# Patient Record
Sex: MALE | Race: BLACK OR AFRICAN AMERICAN | Employment: FULL TIME | URBAN - METROPOLITAN AREA
[De-identification: names, ages, dates, MRNs, and addresses within clinical notes are randomized per-mention and may not be internally consistent; named-entity substitution may affect disease eponyms.]

---

## 2019-12-26 PROBLEM — N40.1 BPH WITH OBSTRUCTION/LOWER URINARY TRACT SYMPTOMS: Status: ACTIVE | Noted: 2019-12-26

## 2019-12-26 PROBLEM — N52.9 ERECTILE DYSFUNCTION: Status: ACTIVE | Noted: 2019-12-26

## 2019-12-26 PROBLEM — N13.8 BPH WITH OBSTRUCTION/LOWER URINARY TRACT SYMPTOMS: Status: ACTIVE | Noted: 2019-12-26

## 2020-11-18 ENCOUNTER — HOSPITAL ENCOUNTER (OUTPATIENT)
Dept: PHYSICAL THERAPY | Age: 65
Discharge: HOME | End: 2020-11-18
Payer: COMMERCIAL

## 2020-11-18 PROCEDURE — 97140 MANUAL THERAPY 1/> REGIONS: CPT

## 2020-11-18 PROCEDURE — 97162 PT EVAL MOD COMPLEX 30 MIN: CPT

## 2020-11-18 NOTE — PROGRESS NOTES
[facility]  [address] [place] [address] [address],[facility], [facility] - Phone: [phone]  Fax: [phone] / [address] [address] PHYSICAL THERAPY SERVICES  Patient Name: Patrick Ballard : 1955   Medical   Diagnosis: Right hip pain [M25.551] Treatment Diagnosis: Right hip pain [M25.551]   Onset Date:      Referral Source: Robert Jimenez MD [facility] [place], [facility]): 2020   Prior Hospitalization: See medical history Provider #: [id number]   Prior Level of Function: Pain free and unlimited stair navigation and prolonged ambulation   Comorbidities: Arthritis, BMI >30 HTN, and R THR (2017)   Medications: Verified on Patient Summary List   The Plan of Care and following information is based on the information from the initial evaluation.   ===========================================================================================  Assessment / key information: Patient is a 60 y.o. male who presents to [facility] at [facility] [place], [facility]. with diagnosis of Right hip pain [M25.551]. Patient reports right hip pain began 4 months ago with insidious onset. Notes h/o R THR 2017. Pt describes right hip pain as an intermittent stiffness located in the lateral aspect of the hip. Patient's pain level is rated at 0/10 at the best, 3/10 currently, and 4/10 at the worst. Hip pain increases with navigating stairs, prolonged ambulation (around [employer]), and transferring out of truck, decreases with rest.   Upon objective evaluation patient presents with impaired AROM of the right hip in ER (11 deg), extension (-15 deg) and ABD (26 deg), grossly impaired hip strength (Psoas 4/5, Glute Med 3/5, and Glute Max 2+/5), impaired static standing balance (R/L SLS = \"), and decreased flexibility of hamstring (R Ham 90/90 = 150 deg), hip flexor, and, quadriceps muscles.  Demonstrates increased mm tone and TTP to R ITB and greater trochanter. Patient scored 51/100 on FOTO indicating decreased function and quality of life. Patient can benefit from skilled PT to increase hip ROM, strength, joint mobility, flexibility, and balance, decrease swelling, tone, TTP, and pain to improve overall function and quality of life. Hip Joint Left  AROM Right  AROM Left  Strength Right  Strength   Flexion 92 91 5 4   Extension -8 -15 2+ 2+   Abduction 58 26 3+ 3   IR 35 45 5 4+   ER 25 11 5 4+     ==========================================================================================================================================================================  Eval Complexity: History HIGH Complexity :3+ comorbidities / personal factors will impact the outcome/ POC ;  Examination  HIGH Complexity : 4+ Standardized tests and measures addressing body structure, function, activity limitation and / or participation in recreation ; Presentation MEDIUM Complexity : Evolving with changing characteristics ;   Decision Making MEDIUM Complexity : FOTO score of 26-74; Overall Complexity MEDIUM  Problem List: pain affecting function, decrease ROM, decrease strength, edema affecting function, impaired gait/ balance, decrease ADL/ functional abilities, decrease activity tolerance, decrease flexibility/ joint mobility and decrease transfer abilities   Treatment Plan may include any combination of the following: Therapeutic exercise, Therapeutic activities, Neuromuscular re-education, Physical agent/modality, Gait/balance training, Manual therapy, Aquatic therapy, Patient education, Self Care training, Functional mobility training, Home safety training and Stair training  Patient / Family readiness to learn indicated by: asking questions, trying to perform skills and interest  Persons(s) to be included in education: patient (P)  Barriers to Learning/Limitations: None  Measures taken, if barriers to learning:    Patient Goal (s): \"Improve mobility\"   Patient self reported health status: good  Rehabilitation Potential: good   Short Term Goals: To be accomplished in  2  weeks:  1) Establish home exercise program.  2) Patient will report decreased c/o pain to < or = 3/10 at the worst to facilitate improved ease with prolonged standing ADLs with manageable sx in the right hip.  Long Term Goals: To be accomplished in  5  weeks:  1) Patient to be independent & compliant with a progressive, high level HEP in order to maintain gains made in physical therapy. 2) Patient to increased walking duration around [employer] to unrestricted and pain free in order to return to PLOF. 3) Increase FOTO to 69/100 indicating improved function and quality of life. 4) Patient will increase right hip strength in ABD to 4-/5 so patient has improved ability to tolerate prolonged ambulation. Frequency / Duration:   Patient to be seen  2  times per week for 5  weeks:  Patient / Caregiver education and instruction: self care, activity modification, brace/ splint application and exercises  Therapist Signature: Candelaria Molina Date: 66/04/9830   Certification Period: n/a Time: 8:51 AM   ===========================================================================================  I certify that the above Physical Therapy Services are being furnished while the patient is under my care. I agree with the treatment plan and certify that this therapy is necessary. Physician Signature:        Date:       Time:     Please sign and return to [facility] at [facility], [facility]. or you may fax the signed copy to [phone]. Thank you.

## 2020-11-18 NOTE — PROGRESS NOTES
PHYSICAL THERAPY - DAILY TREATMENT NOTE    Patient Name: Nellie Kumar        Date: 2020  : 1955   Yes Patient  Verified  Visit #:   1   of   10  Insurance: Payor: [employer] / Plan: [employer] [facility] / Product Type: PPO /      In time: 3:58 Out time: 4:26   Total Treatment Time: 28     Medicare/BCBS Time Tracking (below)   Total Timed Codes (min):  8 1:1 Treatment Time:  8     TREATMENT AREA =  Right hip pain [M25.551]    SUBJECTIVE  Pain Level (on 0 to 10 scale):  0  / 10   Medication Changes/New allergies or changes in medical history, any new surgeries or procedures?    no  If yes, update Summary List   Subjective Functional Status/Changes:  []  No changes reported     See POC         OBJECTIVE  Modalities Rationale:     8 min Manual Therapy: L S/L STM/DTM to lateral RF,glute med, ITB and TFL   Rationale:      decrease pain, increase ROM, increase tissue extensibility and decrease trigger points to improve patient's ability to navigate stairs  The manual therapy interventions were performed at a separate and distinct time from the therapeutic activities interventions.      Billed With/As:   [x] TE   [] TA   [] Neuro   [] Self Care Patient Education: [x] Review HEP    [x] Progressed/Changed HEP based on:   [x] positioning   [x] body mechanics   [] transfers   [] heat/ice application    [] other:      Other Objective/Functional Measures:    See POC     Post Treatment Pain Level (on 0 to 10) scale:   0  / 10     ASSESSMENT  Assessment/Changes in Function:     See POC     []  See Progress Note/Recertification   Patient will continue to benefit from skilled PT services to modify and progress therapeutic interventions, address functional mobility deficits, address ROM deficits, address strength deficits, analyze and address soft tissue restrictions, analyze and cue movement patterns, analyze and modify body mechanics/ergonomics, assess and modify postural abnormalities and instruct in home and community integration to attain remaining goals.    Progress toward goals / Updated goals:    See newly established goals in POC     PLAN  [x]  Upgrade activities as tolerated yes Continue plan of care   []  Discharge due to :    []  Other:      Therapist: America Rosen    Date: 11/18/2020 Time: 8:51 AM     Future Appointments   Date Time Provider Milind Alcocer   11/18/2020  4:45 PM Dulce Florez

## 2020-12-02 ENCOUNTER — HOSPITAL ENCOUNTER (OUTPATIENT)
Dept: PHYSICAL THERAPY | Age: 65
Discharge: HOME | End: 2020-12-02
Payer: COMMERCIAL

## 2020-12-02 PROCEDURE — 97110 THERAPEUTIC EXERCISES: CPT

## 2020-12-02 PROCEDURE — 97140 MANUAL THERAPY 1/> REGIONS: CPT

## 2020-12-02 NOTE — PROGRESS NOTES
PHYSICAL THERAPY - DAILY TREATMENT NOTE    Patient Name: Daly Lopez        Date: 2020  : 1955   yes Patient  Verified  Visit #:     Insurance: Payor: BLUE CROSS / Plan: [employer] [facility] / Product Type: PPO /      In time: 325 Out time: 405   Total Treatment Time: 40     Medicare/[employer] Time Tracking (below)   Total Timed Codes (min):  40 1:1 Treatment Time:  40     TREATMENT AREA =  Right hip pain [M25.551]    SUBJECTIVE  Pain Level (on 0 to 10 scale):  0  / 10   Medication Changes/New allergies or changes in medical history, any new surgeries or procedures?    no  If yes, update Summary List   Subjective Functional Status/Changes:  []  No changes reported     Pt reports no pain in the hip, just a general discomfort. OBJECTIVE    30 min Therapeutic Exercise:  [x]  See flow sheet   Rationale:      increase ROM, increase strength, improve coordination and improve balance to improve the patients ability to perform pain free ADLs. 10 min Manual Therapy: TPR (R) glute med. Rationale:      decrease pain, increase ROM, increase tissue extensibility and decrease trigger points to improve patient's ability to perform pain free ADLs. The manual therapy interventions were performed at a separate and distinct time from the therapeutic activities interventions. Billed With/As:   [x] TE   [] TA   [] Neuro   [] Self Care Patient Education: [x] Review HEP    [] Progressed/Changed HEP based on:   [] positioning   [] body mechanics   [] transfers   [] heat/ice application    [] other:      Other Objective/Functional Measures: Added multiple exercises to improve LE/core strength for ADLs. See flow sheet. Post Treatment Pain Level (on 0 to 10) scale:   0   / 10     ASSESSMENT  Assessment/Changes in Function:     Good response to initial therex.       []  See Progress Note/Recertification   Patient will continue to benefit from skilled PT services to modify and progress therapeutic interventions, address functional mobility deficits, address ROM deficits, address strength deficits, analyze and address soft tissue restrictions, analyze and cue movement patterns, analyze and modify body mechanics/ergonomics and assess and modify postural abnormalities to attain remaining goals. Progress toward goals / Updated goals:    Initiated therex.       PLAN  [x]  Upgrade activities as tolerated yes Continue plan of care   []  Discharge due to :    []  Other:      Therapist: Chrystal Jacobson PTA    Date: 12/2/2020 Time: 3:29 PM     Future Appointments   Date Time Provider Milind Alcocer   12/2/2020  3:45 PM [facility]   12/7/2020  3:45 PM [facility]   12/9/2020  3:45 PM Merlene Rollins [facility]   12/14/2020  3:45 PM [facility]   12/16/2020  3:15 PM [facility]   12/21/2020  3:45 PM Merlene Ria [facility]   12/28/2020  3:45 PM [facility]   12/30/2020  4:00 PM [facility]   1/4/2021  3:45 PM [facility]   1/6/2021  4:00 [facility]   1/11/2021  3:45 PM [facility]   1/13/2021  4:00 PM Damián De Anda

## 2020-12-07 ENCOUNTER — APPOINTMENT (OUTPATIENT)
Dept: PHYSICAL THERAPY | Age: 65
End: 2020-12-07
Payer: COMMERCIAL

## 2020-12-09 ENCOUNTER — HOSPITAL ENCOUNTER (OUTPATIENT)
Dept: PHYSICAL THERAPY | Age: 65
Discharge: HOME | End: 2020-12-09
Payer: COMMERCIAL

## 2020-12-09 PROCEDURE — 97140 MANUAL THERAPY 1/> REGIONS: CPT

## 2020-12-09 PROCEDURE — 97110 THERAPEUTIC EXERCISES: CPT

## 2020-12-09 NOTE — PROGRESS NOTES
PHYSICAL THERAPY - DAILY TREATMENT NOTE    Patient Name: Paris Wakefield        Date: 2020  : 1955   yes Patient  Verified  Visit #:   3   of   12  Insurance: Payor: BLUE CROSS / Plan: [employer] [facility] / Product Type: PPO /      In time: 345 Out time: 425   Total Treatment Time: 40     Medicare/[employer] Time Tracking (below)   Total Timed Codes (min):  40 1:1 Treatment Time:       TREATMENT AREA =  Right hip pain [M25.551]    SUBJECTIVE  Pain Level (on 0 to 10 scale):  5  / 10   Medication Changes/New allergies or changes in medical history, any new surgeries or procedures?    no  If yes, update Summary List   Subjective Functional Status/Changes:  []  No changes reported     I was pretty sore after the last session. OBJECTIVE  30 min Therapeutic Exercise:  [x]  See flow sheet   Rationale:      increase ROM, increase strength, improve coordination and improve balance to improve the patients ability to perform pain free ADLs. 10 Min Manual Therapy: TPR (R) glute/piriformis. Rationale:      decrease pain, increase ROM, increase tissue extensibility and decrease trigger points to improve patient's ability to perform pain free ADLs. The manual therapy interventions were performed at a separate and distinct time from the therapeutic activities interventions. Billed With/As:   [x] TE   [] TA   [] Neuro   [] Self Care Patient Education: [x] Review HEP    [] Progressed/Changed HEP based on:   [] positioning   [] body mechanics   [] transfers   [] heat/ice application    [] other:      Other Objective/Functional Measures: Therex per flow sheet. Post Treatment Pain Level (on 0 to 10) scale:   0   / 10     ASSESSMENT  Assessment/Changes in Function:     TTP (R) glute med.        []  See Progress Note/Recertification   Patient will continue to benefit from skilled PT services to modify and progress therapeutic interventions, address functional mobility deficits, address ROM deficits, address strength deficits, analyze and address soft tissue restrictions, analyze and cue movement patterns, analyze and modify body mechanics/ergonomics and assess and modify postural abnormalities to attain remaining goals. Progress toward goals / Updated goals:    No change in progress toward LTG's with today's session.       PLAN  [x]  Upgrade activities as tolerated yes Continue plan of care   []  Discharge due to :    []  Other:      Therapist: Edgard Lovett PTA    Date: 12/9/2020 Time: 4:04 PM     Future Appointments   Date Time Provider Milind Alcocer   12/14/2020  3:45 [facility]   12/16/2020  3:15 [facility]   12/21/2020  3:45 PM Kitty Zepeda PTA [address]   12/28/2020  3:45 PM Izzy Preeti [facility]   12/30/2020  4:00 PM Clementina Arnold [facility]   1/4/2021  3:45 [facility]   1/6/2021  4:00 PM Izzy Din [facility]   1/11/2021  3:45 PM [facility]   1/13/2021  4:00 PM Zeke Kitchen

## 2020-12-14 ENCOUNTER — HOSPITAL ENCOUNTER (OUTPATIENT)
Dept: PHYSICAL THERAPY | Age: 65
Discharge: HOME | End: 2020-12-14
Payer: COMMERCIAL

## 2020-12-14 PROCEDURE — 97140 MANUAL THERAPY 1/> REGIONS: CPT

## 2020-12-14 PROCEDURE — 97110 THERAPEUTIC EXERCISES: CPT

## 2020-12-14 NOTE — PROGRESS NOTES
PHYSICAL THERAPY - DAILY TREATMENT NOTE    Patient Name: Garrett Pena        Date: 2020  : 1955   yes Patient  Verified  Visit #:     Insurance: Payor: BLUE CROSS / Plan: [employer] [facility] / Product Type: PPO /      In time: 3:40 Out time: 4:20   Total Treatment Time: 40     Medicare/BCBS Time Tracking (below)   Total Timed Codes (min):  40 1:1 Treatment Time: 40     TREATMENT AREA =  Right hip pain [M25.551]    SUBJECTIVE  Pain Level (on 0 to 10 scale):  4-5  / 10   Medication Changes/New allergies or changes in medical history, any new surgeries or procedures?    no  If yes, update Summary List   Subjective Functional Status/Changes:  []  No changes reported     Every days is laborous - it gets tiered and worn out. I was doing them today at work. They work work but you can pay a price       OBJECTIVE  31 min Therapeutic Exercise:  [x]  See flow sheet   Rationale:      increase ROM, increase strength, improve coordination and improve balance to improve the patients ability to perform pain free ADLs. 9 Min Manual Therapy: L S/L STM/DTM to lateral RF,glute med, ITB and TFL   Rationale:      decrease pain, increase ROM, increase tissue extensibility and decrease trigger points to improve patient's ability to navigate stairs. The manual therapy interventions were performed at a separate and distinct time from the therapeutic activities interventions. Billed With/As:   [x] TE   [] TA   [] Neuro   [] Self Care Patient Education: [x] Review HEP    [] Progressed/Changed HEP based on:   [] positioning   [] body mechanics   [] transfers   [] heat/ice application    [] other:      Other Objective/Functional Measures:    Supine bridge = ~65%  Added ITB stretch and patient reports stretching denies pain      Post Treatment Pain Level (on 0 to 10) scale:   3  / 10     ASSESSMENT  Assessment/Changes in Function:     Continues to demonstrate TTP and incr tone to R lateral hip. Issued HEP. []  See Progress Note/Recertification   Patient will continue to benefit from skilled PT services to modify and progress therapeutic interventions, address functional mobility deficits, address ROM deficits, address strength deficits, analyze and address soft tissue restrictions, analyze and cue movement patterns, analyze and modify body mechanics/ergonomics and assess and modify postural abnormalities to attain remaining goals. Progress toward goals / Updated goals:    1) Establish home exercise program. Goal in progress (12/14/2020)  2) Patient will report decreased c/o pain to < or = 3/10 at the worst to facilitate improved ease with prolonged standing ADLs with manageable sx in the right hip.  Goal in progress 5/10 pain at the worst (12/14/2020)     PLAN  [x]  Upgrade activities as tolerated yes Continue plan of care   []  Discharge due to :    []  Other:      Therapist: Don Hood    Date: 12/14/2020 Time: 4:04 PM     Future Appointments   Date Time Provider Milind Alcocer   12/14/2020  3:45 PM [facility]   12/16/2020  3:15 PM M[facility]is [facility]   12/21/2020  3:45 PM Divya Gallegos [facility]   12/28/2020  3:45 PM [facility]   12/30/2020  4:00 PM May Justice [facility]   1/4/2021  3:45 PM Murtis [facility]   1/6/2021  4:00 PM May Justice [facility]   1/11/2021  3:45 PM May Justice [facility]   1/13/2021  4:00 PM Kourtney Xie

## 2020-12-16 ENCOUNTER — HOSPITAL ENCOUNTER (OUTPATIENT)
Dept: PHYSICAL THERAPY | Age: 65
End: 2020-12-16
Payer: COMMERCIAL

## 2020-12-21 ENCOUNTER — HOSPITAL ENCOUNTER (OUTPATIENT)
Dept: PHYSICAL THERAPY | Age: 65
Discharge: HOME | End: 2020-12-21
Payer: COMMERCIAL

## 2020-12-21 PROCEDURE — 97140 MANUAL THERAPY 1/> REGIONS: CPT

## 2020-12-21 PROCEDURE — 97110 THERAPEUTIC EXERCISES: CPT

## 2020-12-21 NOTE — PROGRESS NOTES
PHYSICAL THERAPY - DAILY TREATMENT NOTE    Patient Name: Claudia Reyes        Date: 2020  : 1955   yes Patient  Verified  Visit #:     Insurance: Payor: BLUE CROSS / [facility] / Product Type: PPO /      In time: 320 Out time: 4   Total Treatment Time: 40     Medicare/[employer] Time Tracking (below)   Total Timed Codes (min):  40 1:1 Treatment Time:       TREATMENT AREA =  Right hip pain [M25.551]    SUBJECTIVE  Pain Level (on 0 to 10 scale):  4  / 10   Medication Changes/New allergies or changes in medical history, any new surgeries or procedures?    no  If yes, update Summary List   Subjective Functional Status/Changes:  []  No changes reported     Pt reporting recent max pain of 5/10, 60% improvement in symptoms since beginning PT. Pt reports no limit to walking around [employer], pain free. OBJECTIVE  30 min Therapeutic Exercise:  [x]  See flow sheet   Rationale:      increase ROM, increase strength and improve coordination to improve the patients ability to perform pain free ADLs. 10 min Manual Therapy: TPR (R) glute. Rationale:      decrease pain, increase ROM, increase tissue extensibility and decrease trigger points to improve patient's ability to perform pain free ADLs. The manual therapy interventions were performed at a separate and distinct time from the therapeutic activities interventions.       Billed With/As:   [x] TE   [] TA   [] Neuro   [] Self Care Patient Education: [x] Review HEP    [] Progressed/Changed HEP based on:   [] positioning   [] body mechanics   [] transfers   [] heat/ice application    [] other:      Other Objective/Functional Measures:    FOTO = 56  GROC = +4     (R) hip strength: Abd = 3+/5     Post Treatment Pain Level (on 0 to 10) scale:   0   / 10     ASSESSMENT  Assessment/Changes in Function:     See PN     []  See Progress Note/Recertification   Patient will continue to benefit from skilled PT services to modify and progress therapeutic interventions, address functional mobility deficits, address ROM deficits, address strength deficits, analyze and address soft tissue restrictions, analyze and cue movement patterns, analyze and modify body mechanics/ergonomics and assess and modify postural abnormalities to attain remaining goals.    Progress toward goals / Updated goals:    See PN     PLAN  [x]  Upgrade activities as tolerated yes Continue plan of care   []  Discharge due to :    []  Other:      Therapist: Rosalie Hi, PTA    Date: 12/21/2020 Time: 4:33 PM     Future Appointments   Date Time Provider Milind Alcocer   12/28/2020  3:45 PM Kit [address]   12/30/2020  4:00 PM Scotty Stade [facility]   1/4/2021  3:45 PM Lyme Stade [facility]   1/6/2021  4:00 PM Lyme Stade [facility]   1/11/2021  3:45 PM Lyme Stade [facility]   1/13/2021  4:00 PM [facility]

## 2020-12-21 NOTE — PROGRESS NOTES
[address] [facility] PHYSICAL THERAPY   [facility] [address], [address],[employer], [facility] - Phone: [phone]  Fax: [phone]  PROGRESS NOTE  Patient Name: Nellie Kumar : 1955   Treatment/Medical Diagnosis: Right hip pain [M25.551]   Referral Source: Loreta Alas MD     Date of Initial Visit: 2020 Attended Visits: 5 Missed Visits: 1     SUMMARY OF TREATMENT  Pt has attended 5 PT sessions consisting of initial evaluation, therapeutic exercises, HEP, manual therapy, patient education, and modalities to improve LE strength/stability, decrease pain, and prepare for DC with HEP. CURRENT STATUS  Pt [facility] to [facility] w/ c/o (R) hip pain. Pt reporting recent max pain of 5/10, 60% improvement in symptoms since beginning PT. Pt reporting significant improvement in ambulation ability - able to walk around the store [employer]) without increases in pain. Current FOTO = 56, GROC = +4. (R) hip strength in abd is 3+/5. Pt remains TTP along (R) glute/glute med. Pt is partially compliant with HEP. Goal/Measure of Progress Goal Met? 1. Patient to be independent & compliant with a progressive, high level HEP in order to maintain gains made in physical therapy. Status at last Eval: Na  Current Status: HEP established, partial compliance  progressing   2. Patient to increased walking duration around [employer] to unrestricted and pain free in order to return to OF. Status at last Eval: Elevated pain Current Status: Unrestricted and without pain per pt.  yes   3. Increase FOTO to 69/100 indicating improved function and quality of life. Status at last Eval: 51 Current Status: 56 progressing     4. Patient will increase right hip strength in ABD to 4-/5 so patient has improved ability to tolerate prolonged ambulation.    Status at last Eval: 3/5  Current Status: 3+/5  progressing     New Goals to be achieved in __4__  weeks:  1) Patient to be independent & compliant with a progressive, high level HEP in order to maintain gains made in physical therapy. 2) Increase FOTO to 69/100 indicating improved function and quality of life. 3) Patient will increase right hip strength in ABD to 4-/5 so patient has improved ability to tolerate prolonged ambulation. RECOMMENDATIONS  Pt to continue 2x weekly for up to an additional 4 weeks to further improve LE strength, decrease pain, and prepare for DC to long-term HEP. If you have any questions/comments please contact us directly at [phone]. Thank you for allowing us to assist in the care of your patient. LPTA Signature: Ric Golden PTA  Date: 12/21/2020   PT Signature: Guilherme Voss DPT Time: 4:36 PM   NOTE TO PHYSICIAN:  PLEASE COMPLETE THE ORDERS BELOW AND FAX TO   [facility]: [phone]  If you are unable to process this request in 24 hours please contact our office: [phone]    ___ I have read the above report and request that my patient continue as recommended.   ___ I have read the above report and request that my patient continue therapy with the following changes/special instructions:_________________________________________________________   ___ I have read the above report and request that my patient be discharged from therapy.      Physician Signature:        Date:       Time:

## 2020-12-28 ENCOUNTER — HOSPITAL ENCOUNTER (OUTPATIENT)
Dept: PHYSICAL THERAPY | Age: 65
Discharge: HOME | End: 2020-12-28
Payer: COMMERCIAL

## 2020-12-28 PROCEDURE — 97110 THERAPEUTIC EXERCISES: CPT

## 2020-12-28 PROCEDURE — 97140 MANUAL THERAPY 1/> REGIONS: CPT

## 2020-12-28 NOTE — PROGRESS NOTES
PHYSICAL THERAPY - DAILY TREATMENT NOTE    Patient Name: Cosmo Key        Date: 2020  : 1955   yes Patient  Verified  Visit #:     Insurance: Payor: BLUE CROSS / Plan: Arjun Rocha / Product Type: PPO /      In time: 3:16 Out time: 4:00   Total Treatment Time: 44     Medicare/[facility] Time Tracking (below)   Total Timed Codes (min):  44 1:1 Treatment Time: 44     TREATMENT AREA =  Right hip pain [M25.551]    SUBJECTIVE  Pain Level (on 0 to 10 scale):  4  / 10   Medication Changes/New allergies or changes in medical history, any new surgeries or procedures?    no  If yes, update Summary List   Subjective Functional Status/Changes:  []  No changes reported     No new complaints, overall a little better but still gets tiered. Notes R LE numbness today       OBJECTIVE  35 min Therapeutic Exercise:  [x]  See flow sheet   Rationale:      increase ROM, increase strength and improve coordination to improve the patients ability to perform pain free ADLs. 9 min Manual Therapy: L S/L STM/DTM to lateral RF,glute med, ITB and TFL   Rationale:      decrease pain, increase ROM, increase tissue extensibility and decrease trigger points to improve patient's ability to perform pain free ADLs. The manual therapy interventions were performed at a separate and distinct time from the therapeutic activities interventions.     Billed With/As:   [x] TE   [] TA   [] Neuro   [] Self Care Patient Education: [x] Review HEP    [] Progressed/Changed HEP based on:   [] positioning   [] body mechanics   [] transfers   [] heat/ice application    [] other:      Other Objective/Functional Measures:    Increased ankle strategy with SLS, intermittently wraps LE around opposite LE to maintain balance   Added squatting with Tband  Progressed hip ABD and extension to with Tband resistance      Post Treatment Pain Level (on 0 to 10) scale:  4   / 10     ASSESSMENT  Assessment/Changes in Function:     Progressed therex as appropriate with increased challenge. Slow improvements in tone in glute med. []  See Progress Note/Recertification   Patient will continue to benefit from skilled PT services to modify and progress therapeutic interventions, address functional mobility deficits, address ROM deficits, address strength deficits, analyze and address soft tissue restrictions, analyze and cue movement patterns, analyze and modify body mechanics/ergonomics and assess and modify postural abnormalities to attain remaining goals. Progress toward goals / Updated goals:    Progressing towards LTGs for strengthening.       PLAN  [x]  Upgrade activities as tolerated yes Continue plan of care   []  Discharge due to :    []  Other:      Therapist: Bob Weiss    Date: 12/28/2020 Time: 4:33 PM     Future Appointments   Date Time Provider Milind Alcocer   12/28/2020  3:45 PM Cholo Beverly [address]   12/30/2020  4:00 PM Elmer [facility]   1/4/2021  3:45 PM Elmer Carvalho [facility]   1/6/2021  4:00 PM Elmer Carvalho [facility]   1/11/2021  3:45 PM Elmer Carvalho [facility]   1/13/2021  4:00 PM Haydee Bray

## 2020-12-30 ENCOUNTER — HOSPITAL ENCOUNTER (OUTPATIENT)
Dept: PHYSICAL THERAPY | Age: 65
End: 2020-12-30
Payer: COMMERCIAL

## 2021-01-04 ENCOUNTER — HOSPITAL ENCOUNTER (OUTPATIENT)
Dept: PHYSICAL THERAPY | Age: 66
Discharge: HOME | End: 2021-01-04
Payer: COMMERCIAL

## 2021-01-04 PROCEDURE — 97110 THERAPEUTIC EXERCISES: CPT

## 2021-01-04 PROCEDURE — 97140 MANUAL THERAPY 1/> REGIONS: CPT

## 2021-01-04 NOTE — PROGRESS NOTES
PHYSICAL THERAPY - DAILY TREATMENT NOTE    Patient Name: Rosa Marshall        Date: 2021  : 1955   yes Patient  Verified  Visit #:     Insurance: Payor: BLUE CROSS / Plan: [facility] / Product Type: PPO /      In time: 3:28 Out time: 4:05   Total Treatment Time: 37     Medicare/[employer] Time Tracking (below)   Total Timed Codes (min):  37 1:1 Treatment Time: 24     TREATMENT AREA =  Right hip pain [M25.551]    SUBJECTIVE  Pain Level (on 0 to 10 scale):  4  / 10   Medication Changes/New allergies or changes in medical history, any new surgeries or procedures?    no  If yes, update Summary List   Subjective Functional Status/Changes:  []  No changes reported     Its doing pretty good still have the wore out feeling but its doing better          OBJECTIVE  27 (bill 14) min Therapeutic Exercise:  [x]  See flow sheet   Rationale:      increase ROM, increase strength and improve coordination to improve the patients ability to perform pain free ADLs. 10 min Manual Therapy: L S/L STM/DTM to lateral RF,glute med, ITB and TFL, rollator to distal R ITB   Rationale:      decrease pain, increase ROM, increase tissue extensibility and decrease trigger points to improve patient's ability to perform pain free ADLs. The manual therapy interventions were performed at a separate and distinct time from the therapeutic activities interventions.     Billed With/As:   [x] TE   [] TA   [] Neuro   [] Self Care Patient Education: [x] Review HEP    [] Progressed/Changed HEP based on:   [] positioning   [] body mechanics   [] transfers   [] heat/ice application    [] other:      Other Objective/Functional Measures:    Marked mm tone to distal R ITB  Reports numbness to lateral R thigh since s/p surgery   Post Treatment Pain Level (on 0 to 10) scale:  4  / 10     ASSESSMENT  Assessment/Changes in Function:     Continued with treatment program with good tolerance - denies incr in pain post session, however does report \"worn out\" feeling in the hip      []  See Progress Note/Recertification   Patient will continue to benefit from skilled PT services to modify and progress therapeutic interventions, address functional mobility deficits, address ROM deficits, address strength deficits, analyze and address soft tissue restrictions, analyze and cue movement patterns, analyze and modify body mechanics/ergonomics and assess and modify postural abnormalities to attain remaining goals. Progress toward goals / Updated goals:    Progressing towards LTGs for strengthening.       PLAN  [x]  Upgrade activities as tolerated yes Continue plan of care   []  Discharge due to :    []  Other:      Therapist: Cielo Hall    Date: 1/4/2021 Time: 4:33 PM     Future Appointments   Date Time Provider Milind Alcocer   1/4/2021  3:45 PM Marcy Eduardo [facility]   1/6/2021  4:00 PM Zakiya Bryant [facility]   1/11/2021  3:45 PM Zakiya Bryant [facility]   1/13/2021  4:00 PM Luisito Benitez

## 2021-01-05 NOTE — PROGRESS NOTES
PHYSICAL THERAPY - DAILY TREATMENT NOTE    Patient Name: Gena Brink        Date: 2021  : 1955   yes Patient  Verified  Visit #:     Insurance: Payor: BLUE CROSS / [facility] / Product Type: PPO /      In time: 3:29 Out time: 4:10   Total Treatment Time: 41     Medicare/[employer] Time Tracking (below)   Total Timed Codes (min):  41 1:1 Treatment Time: 41     TREATMENT AREA =  Right hip pain [M25.551]    SUBJECTIVE  Pain Level (on 0 to 10 scale):  4  / 10   Medication Changes/New allergies or changes in medical history, any new surgeries or procedures?    no  If yes, update Summary List   Subjective Functional Status/Changes:  []  No changes reported     Notes soreness s/p last treatment visit, notes that therapy is helping          OBJECTIVE  31 min Therapeutic Exercise:  [x]  See flow sheet   Rationale:      increase ROM, increase strength and improve coordination to improve the patients ability to perform pain free ADLs. 10 min Manual Therapy: L S/L STM/DTM to lateral RF,glute med, ITB and TFL, rollator/STM to distal R ITB   Rationale:      decrease pain, increase ROM, increase tissue extensibility and decrease trigger points to improve patient's ability to perform pain free ADLs. The manual therapy interventions were performed at a separate and distinct time from the therapeutic activities interventions.     Billed With/As:   [x] TE   [] TA   [] Neuro   [] Self Care Patient Education: [x] Review HEP    [] Progressed/Changed HEP based on:   [] positioning   [] body mechanics   [] transfers   [] heat/ice application    [] other:      Other Objective/Functional Measures:    Demonstrates good form with mini squatting   Added SR ball toss at [email or web address] step height to 12\" for step up   Post Treatment Pain Level (on 0 to 10) scale:  0  / 10     ASSESSMENT  Assessment/Changes in Function:     Progressed balance training as appropriate with increased challenge and use of R ankle strategy. []  See Progress Note/Recertification   Patient will continue to benefit from skilled PT services to modify and progress therapeutic interventions, address functional mobility deficits, address ROM deficits, address strength deficits, analyze and address soft tissue restrictions, analyze and cue movement patterns, analyze and modify body mechanics/ergonomics and assess and modify postural abnormalities to attain remaining goals. Progress toward goals / Updated goals:    Progressing towards LTGs. 1-3  1) Patient to be independent & compliant with a progressive, high level HEP in order to maintain gains made in physical therapy. Goal in progress   2) Increase FOTO to 69/100 indicating improved function and quality of life. 3) Patient will increase right hip strength in ABD to 4-/5 so patient has improved ability to tolerate prolonged ambulation.  Goal in progress         PLAN  [x]  Upgrade activities as tolerated yes Continue plan of care   []  Discharge due to :    []  Other:      Therapist: Yovana Castro    Date: 1/6/2021 Time: 4:33 PM     Future Appointments   Date Time Provider Milind Alcocer   1/6/2021  4:00 PM [place] [facility]   1/11/2021  3:45 PM [place] [facility]   1/13/2021  4:00 PM Galileo Guardado

## 2021-01-06 ENCOUNTER — HOSPITAL ENCOUNTER (OUTPATIENT)
Dept: PHYSICAL THERAPY | Age: 66
Discharge: HOME | End: 2021-01-06
Payer: COMMERCIAL

## 2021-01-06 PROCEDURE — 97110 THERAPEUTIC EXERCISES: CPT

## 2021-01-06 PROCEDURE — 97140 MANUAL THERAPY 1/> REGIONS: CPT

## 2021-01-11 ENCOUNTER — HOSPITAL ENCOUNTER (OUTPATIENT)
Dept: PHYSICAL THERAPY | Age: 66
Discharge: HOME | End: 2021-01-11
Payer: COMMERCIAL

## 2021-01-11 PROCEDURE — 97140 MANUAL THERAPY 1/> REGIONS: CPT

## 2021-01-11 PROCEDURE — 97110 THERAPEUTIC EXERCISES: CPT

## 2021-01-12 NOTE — PROGRESS NOTES
PHYSICAL THERAPY - DAILY TREATMENT NOTE    Patient Name: Tasia Gonzalez        Date: 2021  : 1955   YES Patient  Verified  Visit #:   10   of   12  Insurance: Payor: Mariza Posey / Plan: [facility] / Product Type: PPO /      In time: 3:30 Out time: 4:09   Total Treatment Time: 39     Medicare/[employer] Time Tracking (below)   Total Timed Codes (min):  39 1:1 Treatment Time:  39     TREATMENT AREA =  Right hip pain [M25.551]    SUBJECTIVE  Pain Level (on 0 to 10 scale):  3  / 10   Medication Changes/New allergies or changes in medical history, any new surgeries or procedures?    no  If yes, update Summary List   Subjective Functional Status/Changes:  []  No changes reported     Reports 3/10 pain and that's without taking tylenol          OBJECTIVE  Modalities Rationale:     31 min Therapeutic Exercise:  [x]? See flow sheet   Rationale:      increase ROM, increase strength and improve coordination to improve the patients ability to perform pain free ADLs.    8 min Manual Therapy: L S/L rollator/STM to distal R ITB   Rationale:      decrease pain, increase ROM, increase tissue extensibility and decrease trigger points to improve patient's ability to perform pain free ADLs.    The manual therapy interventions were performed at a separate and distinct time from the therapeutic activities interventions.       Billed With/As:   [x] TE   [] TA   [] Neuro   [] Self Care Patient Education: [x] Review HEP    [x] Progressed/Changed HEP based on:   [] positioning   [] body mechanics   [] transfers   [] heat/ice application    [] other:      Other Objective/Functional Measures:    See DC     Post Treatment Pain Level (on 0 to 10) scale:   3  / 10     ASSESSMENT  Assessment/Changes in Function:     See DC     []  See Progress Note/Recertification   Patient will continue to benefit from skilled PT services to modify and progress therapeutic interventions, address functional mobility deficits, address ROM deficits, address strength deficits, analyze and address soft tissue restrictions, analyze and cue movement patterns, analyze and modify body mechanics/ergonomics, assess and modify postural abnormalities and instruct in home and community integration to attain remaining goals.    Progress toward goals / Updated goals:    See DC     PLAN  [x]  Upgrade activities as tolerated Yes HEP for discharge to home mgt of sx   [x]  Discharge due to : Progressing towards or met all goals    []  Other:      Therapist: Juma Black    Date: 1/12/2021 Time: 1:53 PM     Future Appointments   Date Time Provider Milind Alcocer   1/13/2021  4:00 PM Danny Hassan

## 2021-01-12 NOTE — PROGRESS NOTES
[address] PHYSICAL THERAPY  [address] [place] [address] [address], [address],[facility], 70 [facility] - Phone: [phone]  Fax: [phone]  DISCHARGE SUMMARY  Patient Name: Loreta Cates : 1955   Treatment/Medical Diagnosis: Right hip pain [M25.551]   Referral Source: Kayleigh Renteria MD     Date of Initial Visit: 2021 Attended Visits: 10 Missed Visits: 3     SUMMARY OF TREATMENT  Patient has attended 10 PT sessions, including an initial evaluation, for right hip pain. PT has included manual therapy, therapeutic exercises, patient education, body mechanics, posture modification, and home exercise program to improve hip AROM/flexibility, hip strength, glute stability, static standing balance as well as decrease right hip pain. CURRENT STATUS  The pt has progressed well with therapy, consistently reporting decreased pain and increased functional ability. FOTO increased by 20 points indicating improved functional status and quality of life. Patient's self reported Global Rating of Change (GROC) score is (+6) A Great Deal Better. Average right hip pain 3/10 and 5/10 at the worst. Functional improvements include improved tolerance for prolonged ambulation in [employer]. Based on progress from PT services and pt reported improvement, patient is appropriate for DC to home mgt of sx at this time. Goal/Measure of Progress Goal Met? 1. Patient to be independent & compliant with a progressive, high level HEP in order to maintain gains made in physical therapy. Status at last Eval: Goal Established Current Status: I with HEP yes   2. Increase FOTO to 69/100 indicating improved function and quality of life. Status at last Eval: FOTO = 51/100 Current Status: FOTO = 71/100 yes   3. Patient will increase right hip strength in ABD to 4-/5 so patient has improved ability to tolerate prolonged ambulation.    Status at last Eval: R Hip ABD MMT = 3/5 Current Status: R Hip ABD MMT = 4-/5 yes     RECOMMENDATIONS  Discontinue therapy. Progressing towards or have reached established goals. If you have any questions/comments please contact us directly at [phone]. Thank you for allowing us to assist in the care of your patient.     Therapist Signature: Joycelyn Bull Date: 1/12/2021      Time: 1:49 PM

## 2021-01-13 ENCOUNTER — HOSPITAL ENCOUNTER (OUTPATIENT)
Dept: PHYSICAL THERAPY | Age: 66
Discharge: HOME | End: 2021-01-13
Payer: COMMERCIAL

## 2021-01-13 PROCEDURE — 97110 THERAPEUTIC EXERCISES: CPT

## 2021-01-13 PROCEDURE — 97140 MANUAL THERAPY 1/> REGIONS: CPT
